# Patient Record
Sex: MALE | Race: ASIAN | Employment: FULL TIME | URBAN - METROPOLITAN AREA
[De-identification: names, ages, dates, MRNs, and addresses within clinical notes are randomized per-mention and may not be internally consistent; named-entity substitution may affect disease eponyms.]

---

## 2019-11-20 ENCOUNTER — HOSPITAL ENCOUNTER (EMERGENCY)
Age: 24
Discharge: HOME | End: 2019-11-20
Attending: EMERGENCY MEDICINE
Payer: OTHER MISCELLANEOUS

## 2019-11-20 ENCOUNTER — APPOINTMENT (OUTPATIENT)
Dept: GENERAL RADIOLOGY | Age: 24
End: 2019-11-20
Attending: EMERGENCY MEDICINE
Payer: OTHER MISCELLANEOUS

## 2019-11-20 VITALS
TEMPERATURE: 98.1 F | WEIGHT: 135 LBS | BODY MASS INDEX: 19.99 KG/M2 | DIASTOLIC BLOOD PRESSURE: 79 MMHG | HEIGHT: 69 IN | SYSTOLIC BLOOD PRESSURE: 124 MMHG | HEART RATE: 82 BPM | RESPIRATION RATE: 16 BRPM | OXYGEN SATURATION: 100 %

## 2019-11-20 DIAGNOSIS — S68.119A FINGERTIP AMPUTATION, INITIAL ENCOUNTER: Primary | ICD-10-CM

## 2019-11-20 PROCEDURE — 75810000293 HC SIMP/SUPERF WND  RPR: Performed by: EMERGENCY MEDICINE

## 2019-11-20 PROCEDURE — 96372 THER/PROPH/DIAG INJ SC/IM: CPT | Performed by: EMERGENCY MEDICINE

## 2019-11-20 PROCEDURE — 90471 IMMUNIZATION ADMIN: CPT | Performed by: EMERGENCY MEDICINE

## 2019-11-20 PROCEDURE — 90715 TDAP VACCINE 7 YRS/> IM: CPT | Performed by: EMERGENCY MEDICINE

## 2019-11-20 PROCEDURE — 77030002916 HC SUT ETHLN J&J -A

## 2019-11-20 PROCEDURE — 73130 X-RAY EXAM OF HAND: CPT

## 2019-11-20 PROCEDURE — 74011250636 HC RX REV CODE- 250/636: Performed by: EMERGENCY MEDICINE

## 2019-11-20 PROCEDURE — 74011000250 HC RX REV CODE- 250: Performed by: EMERGENCY MEDICINE

## 2019-11-20 PROCEDURE — 99284 EMERGENCY DEPT VISIT MOD MDM: CPT | Performed by: EMERGENCY MEDICINE

## 2019-11-20 RX ORDER — CEPHALEXIN 500 MG/1
500 CAPSULE ORAL 4 TIMES DAILY
Qty: 28 CAP | Refills: 0 | Status: SHIPPED | OUTPATIENT
Start: 2019-11-20 | End: 2019-11-27

## 2019-11-20 RX ORDER — MORPHINE SULFATE 4 MG/ML
6 INJECTION INTRAVENOUS
Status: COMPLETED | OUTPATIENT
Start: 2019-11-20 | End: 2019-11-20

## 2019-11-20 RX ORDER — BUPIVACAINE HYDROCHLORIDE 2.5 MG/ML
10 INJECTION, SOLUTION EPIDURAL; INFILTRATION; INTRACAUDAL ONCE
Status: COMPLETED | OUTPATIENT
Start: 2019-11-20 | End: 2019-11-20

## 2019-11-20 RX ORDER — HYDROCODONE BITARTRATE AND ACETAMINOPHEN 10; 325 MG/1; MG/1
1 TABLET ORAL
Qty: 15 TAB | Refills: 0 | Status: SHIPPED | OUTPATIENT
Start: 2019-11-20 | End: 2019-11-26

## 2019-11-20 RX ADMIN — MORPHINE SULFATE 6 MG: 4 INJECTION INTRAVENOUS at 09:36

## 2019-11-20 RX ADMIN — TETANUS TOXOID, REDUCED DIPHTHERIA TOXOID AND ACELLULAR PERTUSSIS VACCINE, ADSORBED 0.5 ML: 5; 2.5; 8; 8; 2.5 SUSPENSION INTRAMUSCULAR at 09:04

## 2019-11-20 RX ADMIN — BUPIVACAINE HYDROCHLORIDE 25 MG: 2.5 INJECTION, SOLUTION EPIDURAL; INFILTRATION; INTRACAUDAL; PERINEURAL at 09:37

## 2019-11-20 NOTE — ED TRIAGE NOTES
Pt ambulatory to room 8 without complications. Pt has injury to right 4th digit. Pt states he dropped spare tire on it. Area is open and nailbed is torn away from the finger. Possible bony prominence noted with some bleeding.

## 2019-11-20 NOTE — ED NOTES
I have reviewed discharge instructions with the patient. The patient verbalized understanding. Patient left ED via Discharge Method: ambulatory to Home with friend. Opportunity for questions and clarification provided. Patient given 2 scripts. To continue your aftercare when you leave the hospital, you may receive an automated call from our care team to check in on how you are doing. This is a free service and part of our promise to provide the best care and service to meet your aftercare needs.  If you have questions, or wish to unsubscribe from this service please call [phone]. Thank you for Choosing our [facility] Emergency Department.

## 2019-11-20 NOTE — DISCHARGE INSTRUCTIONS
Patient Education        Fingertip Amputation: Care Instructions  Your Care Instructions  Fingertip amputation is a common injury. Treatment depends on how much skin, tissue, bone, and nail were damaged and how much of your finger or thumb was cut off. The doctor may have put stitches in your finger. You may need to see a hand surgeon for more treatment. Your fingertips have many nerves and are very sensitive, so the injury may be very painful. Recovery can take several weeks. Your finger may be sensitive to cold and painful for a year or more. You probably will have a splint to protect your finger as it heals. It is very important that you wear the splint exactly as your doctor tells you. Wearing a splint may interfere with your normal activities. Ask for help with daily tasks if you need it. The doctor has checked you carefully, but problems can develop later. If you notice any problems or new symptoms, get medical treatment right away. Follow-up care is a key part of your treatment and safety. Be sure to make and go to all appointments, and call your doctor if you are having problems. It's also a good idea to know your test results and keep a list of the medicines you take. How can you care for yourself at home? · If your doctor put a splint on your finger, wear the splint exactly as directed. Keep the splint dry. Do not remove it until your doctor says you can. · Keep the cut dry for the first 24 to 48 hours. After this, you can shower if your doctor says it is okay. Pat the cut dry. · Don't soak the cut, such as in a bathtub. Your doctor will tell you when it's safe to get the cut wet. · If your doctor told you how to care for your cut, follow your doctor's instructions. If you did not get instructions, follow this general advice:  ? After the first 24 to 48 hours, wash around the cut with clean water 2 times a day. Don't use hydrogen peroxide or alcohol, which can slow healing.   ? You may cover the cut with a thin layer of petroleum jelly, such as Vaseline, and a nonstick bandage. ? Apply more petroleum jelly and replace the bandage as needed. ? Prop up the sore hand on a pillow anytime you sit or lie down during the next 3 days. Try to keep it above the level of your heart. This will help reduce swelling. ? Avoid any activity that could cause your cut to reopen. ? Do not remove the stitches on your own. Your doctor will tell you when to come back to have the stitches removed. · Be safe with medicines. Take pain medicines exactly as directed. ? If the doctor gave you a prescription medicine for pain, take it as prescribed. ? If you are not taking a prescription pain medicine, ask your doctor if you can take an over-the-counter medicine. · If your doctor prescribed antibiotics, take them as directed. Do not stop taking them just because you feel better. You need to take the full course of antibiotics. When should you call for help? Call your doctor now or seek immediate medical care if:    · You have new pain, or your pain gets worse.     · The skin near the wound is cool or pale or changes color.     · The wound starts to bleed, and blood soaks through the bandage. Oozing small amounts of blood is normal.     · Your wound comes open.     · You have symptoms of infection, such as:  ? Increased pain, swelling, warmth, or redness near the wound. ? Red streaks leading from the wound. ? Pus draining from the wound. ? A fever.    Watch closely for changes in your health, and be sure to contact your doctor if:    · You do not get better as expected. Where can you learn more? Go to http://mannie-kristine.info/. Enter 302 [id number] in the search box to learn more about \"Fingertip Amputation: Care Instructions. \"  Current as of: June 26, 2019  Content Version: 12.2  © 1521-8945 [employer].  Care instructions adapted under license by [employer] (which disclaims liability or warranty for this information). If you have questions about a medical condition or this instruction, always ask your healthcare professional. [place] [address] any warranty or liability for your use of this information.

## 2019-11-20 NOTE — PROGRESS NOTES
SW went to meet with patient who was not in his room. SW will return.      Pardeep Martell, [address] [facility]    [address] [facility]  [email or web address]

## 2019-11-20 NOTE — ED PROVIDER NOTES
Patient dropped a tire on his right ring finger less than an hour ago at work. Is an open wound to the distal portion of the finger with bleeding controlled. Limited range of motion. Numbness present. Unknown last tetanus shot. The history is provided by the patient. No  was used. Finger Pain    This is a new problem. The current episode started less than 1 hour ago. The problem occurs constantly. The problem has not changed since onset. The pain is present in the right fingers. The quality of the pain is described as aching. The pain is mild. Associated symptoms include numbness and limited range of motion. Pertinent negatives include no back pain and no neck pain. The symptoms are aggravated by movement. He has tried nothing for the symptoms. There has been a history of trauma. No past medical history on file. No past surgical history on file. No family history on file.     Social History     Socioeconomic History    Marital status: Not on file     Spouse name: Not on file    Number of children: Not on file    Years of education: Not on file    Highest education level: Not on file   Occupational History    Not on file   Social Needs    Financial resource strain: Not on file    Food insecurity:     Worry: Not on file     Inability: Not on file    Transportation needs:     Medical: Not on file     Non-medical: Not on file   Tobacco Use    Smoking status: Not on file   Substance and Sexual Activity    Alcohol use: Not on file    Drug use: Not on file    Sexual activity: Not on file   Lifestyle    Physical activity:     Days per week: Not on file     Minutes per session: Not on file    Stress: Not on file   Relationships    Social connections:     Talks on phone: Not on file     Gets together: Not on file     Attends Muslim service: Not on file     Active member of club or organization: Not on file     Attends meetings of clubs or organizations: Not on file Relationship status: Not on file    Intimate partner violence:     Fear of current or ex partner: Not on file     Emotionally abused: Not on file     Physically abused: Not on file     Forced sexual activity: Not on file   Other Topics Concern    Not on file   Social History Narrative    Not on file         ALLERGIES: Patient has no known allergies. Review of Systems   Constitutional: Negative for chills and fever. Eyes: Negative for pain and redness. Respiratory: Negative for chest tightness, shortness of breath and wheezing. Cardiovascular: Negative for chest pain and leg swelling. Gastrointestinal: Negative for abdominal pain, diarrhea, nausea and vomiting. Musculoskeletal: Negative for back pain, gait problem, neck pain and neck stiffness. Skin: Positive for wound. Negative for rash. Neurological: Positive for numbness. Negative for weakness and headaches. Vitals:    11/20/19 0823 11/20/19 0824 11/20/19 0825 11/20/19 0826   BP:       Pulse:       Resp:       Temp:       SpO2: 100% 100% 100% 100%   Weight:       Height:                Physical Exam  Constitutional:       Appearance: Normal appearance. He is well-developed. HENT:      Head: Normocephalic and atraumatic. Neck:      Musculoskeletal: Normal range of motion and neck supple. Cardiovascular:      Rate and Rhythm: Normal rate and regular rhythm. Pulmonary:      Effort: Pulmonary effort is normal.      Breath sounds: Normal breath sounds. Abdominal:      General: Bowel sounds are normal.      Palpations: Abdomen is soft. Tenderness: There is no tenderness. Musculoskeletal:        Hands:    Skin:     General: Skin is warm and dry. Neurological:      General: No focal deficit present. Mental Status: He is alert and oriented to person, place, and time. MDM  Number of Diagnoses or Management Options  Diagnosis management comments: Partial amputation of the distal right ring finger.   Distal tuft fracture. Wound irrigated and slightly debrided and closed with 3 sutures to loosely approximate the distal portion of the finger. Will refer to orthopedics for further management. Being a work incident will refer to work well also. Amount and/or Complexity of Data Reviewed  Tests in the radiology section of CPT®: ordered and reviewed  Tests in the medicine section of CPT®: ordered and reviewed    Patient Progress  Patient progress: stable    ED Course as of Nov 20 1122 Wed Nov 20, 2019   1029 Patient ID confirmed. Injured finger identified. No allergies. Digital block performed with 0.25% marcaine. 3ml at the base of the finger on the palmar surface    [GH]      ED Course User Index  [GH] Jose Meyers MD       Wound Repair  Date/Time: 11/20/2019 11:24 AM  Performed by: attendingPreparation: skin prepped with Betadine and sterile field established  Pre-procedure re-eval: Immediately prior to the procedure, the patient was reevaluated and found suitable for the planned procedure and any planned medications. Location details: right ring finger  Wound length:2.6 - 7.5 cm  Anesthesia: digital block    Anesthesia:  Local Anesthetic: bupivacaine 0.25% without epinephrine  Foreign bodies: no foreign bodies  Irrigation solution: saline  Irrigation method: syringe  Debridement: minimal (nail and partial skin)  Skin closure: 4-0 nylon  Number of sutures: 3  Technique: simple  Approximation: loose  Dressing: 4x4 and gauze roll  Patient tolerance: Patient tolerated the procedure well with no immediate complications  My total time at bedside, performing this procedure was 16-30 minutes. XR HAND RT MIN 3 V (Final result)   Result time 11/20/19 08:32:51   Final result by Marcy Habermann, MD (11/20/19 08:32:51)                Impression:    IMPRESSION:  1.  Partial amputation of the distal fourth digit.  In addition a soft tissue  injury, there is a mildly displaced fracture of the distal tuft of the distal  phalanx as described above. Narrative:    RIGHT HAND RADIOGRAPHS, 11/20/2019    CLINICAL HISTORY:  Open injury. TECHNIQUE:  AP, lateral and oblique views of the right hand. FINDINGS:  Three views of the right hand are submitted for evaluation.  The visualized  osseous structures demonstrate normal alignment.  There appears to be partial  amputation of the most distal fourth digit. In addition a soft tissue injury,  there is fracture of the distal tuft of the distal phalanx which is medially  displaced. No acute fracture is otherwise seen.  No definite abnormal radiopaque  soft tissue foreign body is seen.  Density is seen which appears to represent  bandaging artifact.

## 2019-11-25 ENCOUNTER — ANESTHESIA EVENT (OUTPATIENT)
Dept: SURGERY | Age: 24
End: 2019-11-25
Payer: OTHER MISCELLANEOUS

## 2019-11-25 NOTE — H&P
Date of Service: 2019-11-22  Work Status:  ?????     CC: Crush injury of the right ring fingertip    HPI:  The patient is a 66-year-old right-hand-dominant man who works at [employer] as a . Two days ago on 11/20/2019, a tire dropped and crushed the right ring fingertip at work. He was seen in the [facility] emergency room and found to have an avulsion of the nail and laceration around the pulp of the finger and open tuft fracture. This was irrigated and debrided and loosely sutured in the emergency room. Prior to this, the patient had no problem with the hand or fingers. PMH, SH & ROS:  This form has been filled out, reviewed, signed and placed in the patient's chart. I reviewed pertinent positive and negative responses and tried to associate them with the musculoskeletal problem of the patient. Other positive responses were deferred to the patient's primary care physician. The patient has a negative review of systems. He does drink some. He does not smoke. He is generally healthy. He had some type of heart surgery when he was less than 3years of age. CURRENT MEDICATIONS:  Analgesics and an antibiotic for this injury. Otherwise no regular medicines. ALLERGIES:  None known. PE:  The patient is an alert, oriented, young healthy-appearing man. Exam of the left hand shows the patient to have an open wound and crush-type injury of the fingertip of the ring finger. The nail is completely avulsed and absent. The nail bed is torn. The pulp of the finger is viable and has good coloration and actually some sensation to light touch. It is deformed and deviated ulnarward. There is no obvious infection present. The DIP joint has active flexion and extension as does the PIP joint. The other fingers are uninjured.        RADIOGRAPHS:  A review of x-rays from [facility] from 11/20/2019 of the left hand shows the patient to have had an open wound and deformity of the soft tissues of the tip of the ring finger. There is loss of bone along the distal 2 to 3 mm of the tuft of the distal phalanx. There is deformity of the soft tissue noted. DIAGNOSIS:    1. Crush injury of the left ring fingertip with open fracture of the distal phalanx. 2. Nail bed injury of the left ring finger. DISPOSITION:  The problem was discussed with the patient. His wound needs to be revised. I believe it will be possible to save the entire length of the finger with the pulp. It is however now deviated and rotated a bit. I believe he needs to be taken to the operating room for this to be done under good anesthesia and lighting with good instrumentation. He is agreeable. I am going to schedule him for this to be done this coming week as an outpatient under either a digital block or an IV regional.  In the meantime, the patient will need to keep the wound dry and dressed cleanly. He is not able to work as a  at this point.

## 2019-11-26 ENCOUNTER — ANESTHESIA (OUTPATIENT)
Dept: SURGERY | Age: 24
End: 2019-11-26
Payer: OTHER MISCELLANEOUS

## 2019-11-26 ENCOUNTER — HOSPITAL ENCOUNTER (OUTPATIENT)
Age: 24
Setting detail: OUTPATIENT SURGERY
Discharge: HOME | End: 2019-11-26
Attending: ORTHOPAEDIC SURGERY | Admitting: ORTHOPAEDIC SURGERY
Payer: OTHER MISCELLANEOUS

## 2019-11-26 VITALS
HEART RATE: 81 BPM | DIASTOLIC BLOOD PRESSURE: 80 MMHG | RESPIRATION RATE: 16 BRPM | OXYGEN SATURATION: 98 % | WEIGHT: 140 LBS | BODY MASS INDEX: 20.67 KG/M2 | SYSTOLIC BLOOD PRESSURE: 135 MMHG | TEMPERATURE: 98 F

## 2019-11-26 DIAGNOSIS — G89.18 POST-OP PAIN: Primary | ICD-10-CM

## 2019-11-26 PROCEDURE — 76210000020 HC REC RM PH II FIRST 0.5 HR: Performed by: ORTHOPAEDIC SURGERY

## 2019-11-26 PROCEDURE — 74011250636 HC RX REV CODE- 250/636: Performed by: ANESTHESIOLOGY

## 2019-11-26 PROCEDURE — 77030002916 HC SUT ETHLN J&J -A: Performed by: ORTHOPAEDIC SURGERY

## 2019-11-26 PROCEDURE — 74011250636 HC RX REV CODE- 250/636: Performed by: ORTHOPAEDIC SURGERY

## 2019-11-26 PROCEDURE — 76060000032 HC ANESTHESIA 0.5 TO 1 HR: Performed by: ORTHOPAEDIC SURGERY

## 2019-11-26 PROCEDURE — 76010000138 HC OR TIME 0.5 TO 1 HR: Performed by: ORTHOPAEDIC SURGERY

## 2019-11-26 PROCEDURE — 77030018836 HC SOL IRR NACL ICUM -A: Performed by: ORTHOPAEDIC SURGERY

## 2019-11-26 PROCEDURE — 74011250636 HC RX REV CODE- 250/636: Performed by: NURSE ANESTHETIST, CERTIFIED REGISTERED

## 2019-11-26 PROCEDURE — 74011000250 HC RX REV CODE- 250: Performed by: ORTHOPAEDIC SURGERY

## 2019-11-26 PROCEDURE — 77030031139 HC SUT VCRL2 J&J -A: Performed by: ORTHOPAEDIC SURGERY

## 2019-11-26 PROCEDURE — 77030039266 HC ADH SKN EXOFIN S2SG -A: Performed by: ORTHOPAEDIC SURGERY

## 2019-11-26 PROCEDURE — 77030002933 HC SUT MCRYL J&J -A: Performed by: ORTHOPAEDIC SURGERY

## 2019-11-26 PROCEDURE — 77030003602 HC NDL NRV BLK BBMI -B: Performed by: ANESTHESIOLOGY

## 2019-11-26 PROCEDURE — 76210000063 HC OR PH I REC FIRST 0.5 HR: Performed by: ORTHOPAEDIC SURGERY

## 2019-11-26 PROCEDURE — 77030000032 HC CUF TRNQT ZIMM -B: Performed by: ORTHOPAEDIC SURGERY

## 2019-11-26 RX ORDER — HYDROCODONE BITARTRATE AND ACETAMINOPHEN 10; 325 MG/1; MG/1
1 TABLET ORAL
Qty: 20 TAB | Refills: 0 | Status: SHIPPED | OUTPATIENT
Start: 2019-11-26 | End: 2019-12-01

## 2019-11-26 RX ORDER — MIDAZOLAM HYDROCHLORIDE 1 MG/ML
5 INJECTION, SOLUTION INTRAMUSCULAR; INTRAVENOUS ONCE
Status: DISCONTINUED | OUTPATIENT
Start: 2019-11-26 | End: 2019-11-26 | Stop reason: HOSPADM

## 2019-11-26 RX ORDER — HYDROMORPHONE HYDROCHLORIDE 2 MG/ML
0.5 INJECTION, SOLUTION INTRAMUSCULAR; INTRAVENOUS; SUBCUTANEOUS
Status: DISCONTINUED | OUTPATIENT
Start: 2019-11-26 | End: 2019-11-26 | Stop reason: HOSPADM

## 2019-11-26 RX ORDER — PROPOFOL 10 MG/ML
INJECTION, EMULSION INTRAVENOUS AS NEEDED
Status: DISCONTINUED | OUTPATIENT
Start: 2019-11-26 | End: 2019-11-26 | Stop reason: HOSPADM

## 2019-11-26 RX ORDER — FENTANYL CITRATE 50 UG/ML
100 INJECTION, SOLUTION INTRAMUSCULAR; INTRAVENOUS ONCE
Status: DISCONTINUED | OUTPATIENT
Start: 2019-11-26 | End: 2019-11-26 | Stop reason: HOSPADM

## 2019-11-26 RX ORDER — PROPOFOL 10 MG/ML
INJECTION, EMULSION INTRAVENOUS
Status: DISCONTINUED | OUTPATIENT
Start: 2019-11-26 | End: 2019-11-26 | Stop reason: HOSPADM

## 2019-11-26 RX ORDER — LIDOCAINE HYDROCHLORIDE 20 MG/ML
INJECTION, SOLUTION EPIDURAL; INFILTRATION; INTRACAUDAL; PERINEURAL AS NEEDED
Status: DISCONTINUED | OUTPATIENT
Start: 2019-11-26 | End: 2019-11-26 | Stop reason: HOSPADM

## 2019-11-26 RX ORDER — SODIUM CHLORIDE, SODIUM LACTATE, POTASSIUM CHLORIDE, CALCIUM CHLORIDE 600; 310; 30; 20 MG/100ML; MG/100ML; MG/100ML; MG/100ML
75 INJECTION, SOLUTION INTRAVENOUS CONTINUOUS
Status: DISCONTINUED | OUTPATIENT
Start: 2019-11-26 | End: 2019-11-26 | Stop reason: HOSPADM

## 2019-11-26 RX ORDER — CEFAZOLIN SODIUM/WATER 2 G/20 ML
2 SYRINGE (ML) INTRAVENOUS ONCE
Status: COMPLETED | OUTPATIENT
Start: 2019-11-26 | End: 2019-11-26

## 2019-11-26 RX ORDER — FENTANYL CITRATE 50 UG/ML
INJECTION, SOLUTION INTRAMUSCULAR; INTRAVENOUS AS NEEDED
Status: DISCONTINUED | OUTPATIENT
Start: 2019-11-26 | End: 2019-11-26 | Stop reason: HOSPADM

## 2019-11-26 RX ORDER — SODIUM CHLORIDE 0.9 % (FLUSH) 0.9 %
5-40 SYRINGE (ML) INJECTION AS NEEDED
Status: DISCONTINUED | OUTPATIENT
Start: 2019-11-26 | End: 2019-11-26 | Stop reason: HOSPADM

## 2019-11-26 RX ORDER — SODIUM CHLORIDE 0.9 % (FLUSH) 0.9 %
5-40 SYRINGE (ML) INJECTION EVERY 8 HOURS
Status: DISCONTINUED | OUTPATIENT
Start: 2019-11-26 | End: 2019-11-26 | Stop reason: HOSPADM

## 2019-11-26 RX ORDER — MIDAZOLAM HYDROCHLORIDE 1 MG/ML
2 INJECTION, SOLUTION INTRAMUSCULAR; INTRAVENOUS
Status: COMPLETED | OUTPATIENT
Start: 2019-11-26 | End: 2019-11-26

## 2019-11-26 RX ORDER — HYDROCODONE BITARTRATE AND ACETAMINOPHEN 5; 325 MG/1; MG/1
2 TABLET ORAL AS NEEDED
Status: DISCONTINUED | OUTPATIENT
Start: 2019-11-26 | End: 2019-11-26 | Stop reason: HOSPADM

## 2019-11-26 RX ORDER — OXYCODONE HYDROCHLORIDE 5 MG/1
5 TABLET ORAL
Status: DISCONTINUED | OUTPATIENT
Start: 2019-11-26 | End: 2019-11-26 | Stop reason: HOSPADM

## 2019-11-26 RX ORDER — MIDAZOLAM HYDROCHLORIDE 1 MG/ML
INJECTION, SOLUTION INTRAMUSCULAR; INTRAVENOUS AS NEEDED
Status: DISCONTINUED | OUTPATIENT
Start: 2019-11-26 | End: 2019-11-26 | Stop reason: HOSPADM

## 2019-11-26 RX ORDER — LIDOCAINE HYDROCHLORIDE 10 MG/ML
0.1 INJECTION INFILTRATION; PERINEURAL AS NEEDED
Status: DISCONTINUED | OUTPATIENT
Start: 2019-11-26 | End: 2019-11-26 | Stop reason: HOSPADM

## 2019-11-26 RX ADMIN — PROPOFOL 100 MCG/KG/MIN: 10 INJECTION, EMULSION INTRAVENOUS at 12:51

## 2019-11-26 RX ADMIN — Medication 2 G: at 12:42

## 2019-11-26 RX ADMIN — MIDAZOLAM HYDROCHLORIDE 1 MG: 2 INJECTION, SOLUTION INTRAMUSCULAR; INTRAVENOUS at 12:48

## 2019-11-26 RX ADMIN — FENTANYL CITRATE 25 MCG: 50 INJECTION INTRAMUSCULAR; INTRAVENOUS at 12:54

## 2019-11-26 RX ADMIN — FENTANYL CITRATE 25 MCG: 50 INJECTION INTRAMUSCULAR; INTRAVENOUS at 13:11

## 2019-11-26 RX ADMIN — FENTANYL CITRATE 25 MCG: 50 INJECTION INTRAMUSCULAR; INTRAVENOUS at 13:00

## 2019-11-26 RX ADMIN — MIDAZOLAM HYDROCHLORIDE 1 MG: 2 INJECTION, SOLUTION INTRAMUSCULAR; INTRAVENOUS at 13:05

## 2019-11-26 RX ADMIN — MIDAZOLAM 2 MG: 1 INJECTION INTRAMUSCULAR; INTRAVENOUS at 11:49

## 2019-11-26 RX ADMIN — SODIUM CHLORIDE, SODIUM LACTATE, POTASSIUM CHLORIDE, AND CALCIUM CHLORIDE 75 ML/HR: 600; 310; 30; 20 INJECTION, SOLUTION INTRAVENOUS at 11:48

## 2019-11-26 RX ADMIN — FENTANYL CITRATE 25 MCG: 50 INJECTION INTRAMUSCULAR; INTRAVENOUS at 13:05

## 2019-11-26 RX ADMIN — PROPOFOL 50 MG: 10 INJECTION, EMULSION INTRAVENOUS at 12:51

## 2019-11-26 NOTE — OP NOTES
[address] [facility]  OPERATIVE REPORT    Name:  Ricci Arrieta  MR#:  [id number]  :  1995  ACCOUNT #:  [de-identified]  DATE OF SERVICE:  2019    PREOPERATIVE DIAGNOSES:  1. Crush injury of the right ring finger. 2.  Laceration of the nail bed, right ring finger. 3.  Fracture of the distal phalanx, right ring finger. POSTOPERATIVE DIAGNOSES:  1. Crush injury of the right ring finger. 2.  Laceration of the nail bed, right ring finger. 3.  Fracture of the distal phalanx, right ring finger. PROCEDURE PERFORMED:  1. Suturing of soft tissues of crush injury of the right ring finger. 2.  Repair of nail bed, right ring finger. SURGEON:  VIPUL Garcia MD    ASSISTANT:  None. ANESTHESIA:  IV regional.    COMPLICATIONS:  None. SPECIMENS REMOVED:  Tissue removed for histologic exam, none. IMPLANTS:  None. ESTIMATED BLOOD LOSS:  minimal.    PROCEDURAL NOTE:  With the patient under an IV regional anesthetic, the right upper extremity was prepped with ChloraPrep and draped as a sterile field. Time-out was held identifying the patient, surgeon, procedure and operative site. The patient had been given preoperative IV Ancef. The patient had a crush injury of the right ring finger that had occurred approximately a week ago when a tire and rim fell on the finger crushing it against the underlying support. He had had an avulsion of the nail, tearing of the nail bed and fracture of the distal phalanx as well as near circumferential laceration of the soft tissues. He had been seen in the emergency room where the finger had been irrigated and sutured; however, when seen in the office, he was noted to have considerable deformity of the pulp of the finger with it being displaced ulnarward and rotated somewhat. The patient's stitches were taken out. He had a total of three in place.   The wound was then irrigated with bulb syringe and using nontoothed through smooth forceps, Include Location In Plan?: Yes Detail Level: Zone blood clot and dried drainage were gradually removed from the wound. There was noted to have been a complete avulsion of the nail with a tear of the nail fold from the ulnar aspect. The nail bed was also avulsed and the most proximal portion was not beneath the nail fold as it should have been. The finger was positioned in what looked to be essentially anatomic position and a couple of 4-0 nylon stitches placed on the volar radial aspect pulling the distal part of the fingertip back into place. As mentioned, there was a near circumferential laceration with remaining tissues along the ulnar aspect of the pulp. The flap of skin that compressed some of the nail fold was able to be pulled back ulnarward and sutured with a single stitch recreating most of the nail fold. Using 4-0 Vicryl Rapide, a horizontal mattress stitch was placed from the dorsum beneath the nail fold and out into the wound. It was then passed through the most proximal portion of the nail bed tissue and back beneath the nail fold and out dorsally. When this was tied, I was able to pull the proximal part of the nail bed back into the area beneath the nail fold and then recreated. A couple of additional scattered sutures were placed volarly. The finger looked much better after completion of the suturing. As noted, there was an absence of the nail and a piece of Xeroform was tucked into the nail fold or beneath the nail fold to try to keep this open or prevent it from scarring. An additional piece of Xeroform was then placed over the dorsum of the finger and around the cut followed by wrapping with 4x4 gauze and Shahana wrapping. A digital block was done at the metacarpal neck level using 6 or 7 mL of 1% plain lidocaine. The patient had release of the tourniquet with good color returning to the fingertip. The patient tolerated the procedure well, was sent to recovery room in good condition.       [address] [facility] [address] Include Location In Plan Question?: No MD [id number]  D:  11/26/2019 13:45  T:  11/26/2019 15:05  JOB #:  [id number]  CC:  [facility]

## 2019-11-26 NOTE — ANESTHESIA PROCEDURE NOTES
Peripheral Block    Start time: 11/26/2019 12:46 PM  Performed by: Jimmy Cooper CRNA  Authorized by: Elana Watson MD       Pre-procedure:    Indications: at surgeon's request and procedure for pain    Preanesthetic Checklist: patient identified, risks and benefits discussed, site marked, timeout performed, anesthesia consent given and patient being monitored    Timeout Time: 12:45          Block Type:   Block Type:  [place] block  Laterality:  Right  Patient Position:  Supine  Block Limb IV Checked: Yes    Esmarch exsanguination: Yes    Tourniquet Type:  Single  Tourniquet Location:  Below elbow  Tourniquet Inflated:  11/26/2019 12:49 PM  Inflation (mmHg):  250  Limb w/o Radial Pulse: Yes    Infused Agent:  Lidocaine 0.5%  Volume Infused (mL):  45  Tourniquet Deflated:  11/26/2019 1:13 PM    Assessment:    Injection Assessment:   Patient tolerance:  Patient tolerated the procedure well with no immediate complications

## 2019-11-26 NOTE — ANESTHESIA POSTPROCEDURE EVALUATION
Procedure(s):  RIGHT RING FINGER NAIL BED REPAIR AND REVISION OF SOFT TISSE.    regional    Anesthesia Post Evaluation      Multimodal analgesia: multimodal analgesia used between 6 hours prior to anesthesia start to PACU discharge  Patient location during evaluation: bedside  Patient participation: complete - patient participated  Level of consciousness: awake and alert  Pain score: 3  Pain management: adequate  Airway patency: patent  Anesthetic complications: no  Cardiovascular status: acceptable and hemodynamically stable  Respiratory status: acceptable  Hydration status: acceptable  Post anesthesia nausea and vomiting:  none      Vitals Value Taken Time   /80 11/26/2019  1:36 PM   Temp 36.6 °C (97.9 °F) 11/26/2019  1:26 PM   Pulse 80 11/26/2019  1:37 PM   Resp 14 11/26/2019  1:36 PM   SpO2 98 % 11/26/2019  1:37 PM   Vitals shown include unvalidated device data.

## 2019-11-26 NOTE — ANESTHESIA PREPROCEDURE EVALUATION
Relevant Problems   No relevant active problems       Anesthetic History   No history of anesthetic complications            Review of Systems / Medical History  Patient summary reviewed and pertinent labs reviewed    Pulmonary  Within defined limits                 Neuro/Psych   Within defined limits           Cardiovascular  Within defined limits                Exercise tolerance: >4 METS     GI/Hepatic/Renal  Within defined limits              Endo/Other  Within defined limits           Other Findings              Physical Exam    Airway  Mallampati: II  TM Distance: 4 - 6 cm  Neck ROM: normal range of motion   Mouth opening: Normal     Cardiovascular  Regular rate and rhythm,  S1 and S2 normal,  no murmur, click, rub, or gallop             Dental         Pulmonary  Breath sounds clear to auscultation               Abdominal         Other Findings            Anesthetic Plan    ASA: 1  Anesthesia type: regional - Chris block          Induction: Intravenous  Anesthetic plan and risks discussed with: Patient and Family

## 2019-11-26 NOTE — INTERVAL H&P NOTE
H&P Update: 
[place] was seen and examined. Pt is alert and oriented. Chest: Clear w/o SOB. C/V:  RR&R History and physical has been reviewed. The patient has been examined. There have been no significant clinical changes since the completion of the originally dated History and Physical. 
Patient identified by surgeon; surgical site was confirmed by patient and surgeon. The patient is here for suturing of the nailbed and soft tissues of the right ring fingertip.

## 2019-11-26 NOTE — DISCHARGE INSTRUCTIONS
POST OP INSTRUCTIONS      1. Patient has appointment for 12/2/19 at 9:20 AM at the [facility]. 2.  For problems call Dr Roxanne Douglas, [address],  [phone]          Appointments only,  [phone]    3. Leave the dressings on and keep dry. DIET  · Clear liquids until no nausea or vomiting; then light diet for the first day. · Advance to regular diet on second day, unless your doctor orders otherwise. · If nausea and vomiting continues, call your doctor. PAIN  · Take pain medication as directed by your doctor. · Call your doctor if pain is NOT relieved by medication. · DO NOT take aspirin of blood thinners unless directed by your doctor. CALL YOUR DOCTOR IF   · Excessive bleeding that does not stop after holding pressure over the area  · Temperature of 101 degrees F or above  · Excessive redness, swelling or bruising, and/ or green or yellow, smelly discharge from incision    AFTER ANESTHESIA   · For the first 24 hours: DO NOT Drive, Drink alcoholic beverages, or Make important decisions. · Be aware of dizziness following anesthesia and while taking pain medication. After general anesthesia or intravenous sedation, for 24 hours or while taking prescription Narcotics:  · Limit your activities  · A responsible adult needs to be with you for the next 24 hours  · Do not drive and operate hazardous machinery  · Do not make important personal or business decisions  · Do  not drink alcoholic beverages  · If you have not urinated within 8 hours after discharge, please contact your surgeon on call. · If you have sleep apnea and have a CPAP machine, please use it for all naps and sleeping. · Please use caution when taking narcotics and any of your home medications that may cause drowsiness. *  Please give a list of your current medications to your Primary Care Provider.   *  Please update this list whenever your medications are discontinued, doses are      changed, or new medications (including over-the-counter products) are added. *  Please carry medication information at all times in case of emergency situations. These are general instructions for a healthy lifestyle:  No smoking/ No tobacco products/ Avoid exposure to second hand smoke  Surgeon General's Warning:  Quitting smoking now greatly reduces serious risk to your health. Obesity, smoking, and sedentary lifestyle greatly increases your risk for illness  A healthy diet, regular physical exercise & weight monitoring are important for maintaining a healthy lifestyle    You may be retaining fluid if you have a history of heart failure or if you experience any of the following symptoms:  Weight gain of 3 pounds or more overnight or 5 pounds in a week, increased swelling in our hands or feet or shortness of breath while lying flat in bed. Please call your doctor as soon as you notice any of these symptoms; do not wait until your next office visit.

## 2019-11-26 NOTE — BRIEF OP NOTE
BRIEF OPERATIVE NOTE    Date of Procedure: 11/26/2019   Preoperative Diagnosis: Crushing injury of right ring finger, initial encounter [S67.194A]  Postoperative Diagnosis: Crushing injury of right ring finger, initial encounter [A10.409L]    Procedure(s):  RIGHT RING FINGER NAIL BED REPAIR AND REVISION OF SOFT TISSE  Surgeon(s) and Role:     * Marielle Rutledge MD - Primary         Surgical Assistant: none    Surgical Staff:  Circ-1: Jazmin Villa RN  Scrub Tech-1: Tejas Smalls  Event Time In Time Out   Incision Start 1306    Incision Close 1320      Anesthesia: [place]   Estimated Blood Loss: minimal  Specimens: * No specimens in log *   Findings:  Torn nail bed and near circumferential laceration of the soft tissues of the distal phalanx   Complications: none  Implants: * No implants in log *

## 2019-12-18 ENCOUNTER — APPOINTMENT (RX ONLY)
Dept: URBAN - METROPOLITAN AREA CLINIC 349 | Facility: CLINIC | Age: 24
Setting detail: DERMATOLOGY
End: 2019-12-18

## 2019-12-18 DIAGNOSIS — B07.8 OTHER VIRAL WARTS: ICD-10-CM

## 2019-12-18 DIAGNOSIS — L70.0 ACNE VULGARIS: ICD-10-CM

## 2019-12-18 DIAGNOSIS — Z71.89 OTHER SPECIFIED COUNSELING: ICD-10-CM

## 2019-12-18 DIAGNOSIS — D22 MELANOCYTIC NEVI: ICD-10-CM

## 2019-12-18 PROBLEM — D22.5 MELANOCYTIC NEVI OF TRUNK: Status: ACTIVE | Noted: 2019-12-18

## 2019-12-18 PROCEDURE — ? RECOMMENDATIONS

## 2019-12-18 PROCEDURE — ? COUNSELING

## 2019-12-18 PROCEDURE — ? PRESCRIPTION

## 2019-12-18 PROCEDURE — 99202 OFFICE O/P NEW SF 15 MIN: CPT | Mod: 25

## 2019-12-18 PROCEDURE — ? LIQUID NITROGEN

## 2019-12-18 PROCEDURE — 17110 DESTRUCTION B9 LES UP TO 14: CPT

## 2019-12-18 PROCEDURE — ? TREATMENT REGIMEN

## 2019-12-18 RX ORDER — BENZOYL PEROXIDE 95 MG/G
AEROSOL, FOAM TOPICAL
Qty: 1 | Refills: 3 | Status: ERX | COMMUNITY
Start: 2019-12-18

## 2019-12-18 RX ORDER — TAZAROTENE 1 MG/G
AEROSOL, FOAM TOPICAL
Qty: 1 | Refills: 3 | Status: ERX | COMMUNITY
Start: 2019-12-18

## 2019-12-18 RX ADMIN — TAZAROTENE: 1 AEROSOL, FOAM TOPICAL at 00:00

## 2019-12-18 RX ADMIN — BENZOYL PEROXIDE: 95 AEROSOL, FOAM TOPICAL at 00:00

## 2019-12-18 ASSESSMENT — LOCATION DETAILED DESCRIPTION DERM
LOCATION DETAILED: LEFT SMALL FINGERTIP
LOCATION DETAILED: LEFT PROXIMAL PALMAR INDEX FINGER
LOCATION DETAILED: LEFT INFERIOR CENTRAL MALAR CHEEK
LOCATION DETAILED: LEFT DISTAL PALMAR RING FINGER
LOCATION DETAILED: RIGHT MEDIAL INFERIOR CHEST
LOCATION DETAILED: RIGHT INFERIOR MEDIAL FOREHEAD
LOCATION DETAILED: RIGHT SUPERIOR UPPER BACK
LOCATION DETAILED: RIGHT INFERIOR CENTRAL MALAR CHEEK

## 2019-12-18 ASSESSMENT — LOCATION SIMPLE DESCRIPTION DERM
LOCATION SIMPLE: LEFT RING FINGER
LOCATION SIMPLE: RIGHT UPPER BACK
LOCATION SIMPLE: RIGHT FOREHEAD
LOCATION SIMPLE: LEFT INDEX FINGER
LOCATION SIMPLE: LEFT SMALL FINGER
LOCATION SIMPLE: CHEST
LOCATION SIMPLE: LEFT CHEEK
LOCATION SIMPLE: RIGHT CHEEK

## 2019-12-18 ASSESSMENT — LOCATION ZONE DERM
LOCATION ZONE: FINGER
LOCATION ZONE: FACE
LOCATION ZONE: TRUNK

## 2019-12-18 NOTE — PROCEDURE: LIQUID NITROGEN
Include Z78.9 (Other Specified Conditions Influencing Health Status) As An Associated Diagnosis?: Yes
Duration Of Freeze Thaw-Cycle (Seconds): 2
Render Post-Care Instructions In Note?: no
Medical Necessity Information: It is in your best interest to select a reason for this procedure from the list below. All of these items fulfill various CMS LCD requirements except the new and changing color options.
Number Of Freeze-Thaw Cycles: 3 freeze-thaw cycles
Consent: The patient's consent was obtained including but not limited to risks of crusting, scabbing, blistering, scarring, darker or lighter pigmentary change, recurrence, incomplete removal and infection.
Detail Level: Detailed
Post-Care Instructions: I reviewed with the patient in detail post-care instructions. Patient is to wear sunprotection, and avoid picking at any of the treated lesions. Pt may apply Vaseline to crusted or scabbing areas.
Medical Necessity Clause: This procedure was medically necessary because the lesions that were treated were:

## 2019-12-18 NOTE — PROCEDURE: RECOMMENDATIONS
Recommendations (Free Text): Informed pt he should not drink while on antibiotics
Detail Level: Detailed

## 2019-12-18 NOTE — PROCEDURE: COUNSELING
Minocycline Pregnancy And Lactation Text: This medication is Pregnancy Category D and not consider safe during pregnancy. It is also excreted in breast milk.
Spironolactone Counseling: Patient advised regarding risks of diarrhea, abdominal pain, hyperkalemia, birth defects (for female patients), liver toxicity and renal toxicity. The patient may need blood work to monitor liver and kidney function and potassium levels while on therapy. The patient verbalized understanding of the proper use and possible adverse effects of spironolactone.  All of the patient's questions and concerns were addressed.
Birth Control Pills Counseling: Birth Control Pill Counseling: I discussed with the patient the potential side effects of OCPs including but not limited to increased risk of stroke, heart attack, thrombophlebitis, deep venous thrombosis, hepatic adenomas, breast changes, GI upset, headaches, and depression.  The patient verbalized understanding of the proper use and possible adverse effects of OCPs. All of the patient's questions and concerns were addressed.
High Dose Vitamin A Pregnancy And Lactation Text: High dose vitamin A therapy is contraindicated during pregnancy and breast feeding.
Tazorac Pregnancy And Lactation Text: This medication is not safe during pregnancy. It is unknown if this medication is excreted in breast milk.
Topical Retinoid counseling:  Patient advised to apply a pea-sized amount only at bedtime and wait 30 minutes after washing their face before applying.  If too drying, patient may add a non-comedogenic moisturizer. The patient verbalized understanding of the proper use and possible adverse effects of retinoids.  All of the patient's questions and concerns were addressed.
Detail Level: Zone
Tazorac Counseling:  Patient advised that medication is irritating and drying.  Patient may need to apply sparingly and wash off after an hour before eventually leaving it on overnight.  The patient verbalized understanding of the proper use and possible adverse effects of tazorac.  All of the patient's questions and concerns were addressed.
Topical Clindamycin Pregnancy And Lactation Text: This medication is Pregnancy Category B and is considered safe during pregnancy. It is unknown if it is excreted in breast milk.
Tetracycline Counseling: Patient counseled regarding possible photosensitivity and increased risk for sunburn.  Patient instructed to avoid sunlight, if possible.  When exposed to sunlight, patients should wear protective clothing, sunglasses, and sunscreen.  The patient was instructed to call the office immediately if the following severe adverse effects occur:  hearing changes, easy bruising/bleeding, severe headache, or vision changes.  The patient verbalized understanding of the proper use and possible adverse effects of tetracycline.  All of the patient's questions and concerns were addressed. Patient understands to avoid pregnancy while on therapy due to potential birth defects.
Topical Clindamycin Counseling: Patient counseled that this medication may cause skin irritation or allergic reactions.  In the event of skin irritation, the patient was advised to reduce the amount of the drug applied or use it less frequently.   The patient verbalized understanding of the proper use and possible adverse effects of clindamycin.  All of the patient's questions and concerns were addressed.
Azithromycin Pregnancy And Lactation Text: This medication is considered safe during pregnancy and is also secreted in breast milk.
Topical Sulfur Applications Counseling: Topical Sulfur Counseling: Patient counseled that this medication may cause skin irritation or allergic reactions.  In the event of skin irritation, the patient was advised to reduce the amount of the drug applied or use it less frequently.   The patient verbalized understanding of the proper use and possible adverse effects of topical sulfur application.  All of the patient's questions and concerns were addressed.
Isotretinoin Pregnancy And Lactation Text: This medication is Pregnancy Category X and is considered extremely dangerous during pregnancy. It is unknown if it is excreted in breast milk.
Include Pregnancy/Lactation Warning?: No
Erythromycin Pregnancy And Lactation Text: This medication is Pregnancy Category B and is considered safe during pregnancy. It is also excreted in breast milk.
Bactrim Counseling:  I discussed with the patient the risks of sulfa antibiotics including but not limited to GI upset, allergic reaction, drug rash, diarrhea, dizziness, photosensitivity, and yeast infections.  Rarely, more serious reactions can occur including but not limited to aplastic anemia, agranulocytosis, methemoglobinemia, blood dyscrasias, liver or kidney failure, lung infiltrates or desquamative/blistering drug rashes.
High Dose Vitamin A Counseling: Side effects reviewed, pt to contact office should one occur.
Azithromycin Counseling:  I discussed with the patient the risks of azithromycin including but not limited to GI upset, allergic reaction, drug rash, diarrhea, and yeast infections.
Benzoyl Peroxide Counseling: Patient counseled that medicine may cause skin irritation and bleach clothing.  In the event of skin irritation, the patient was advised to reduce the amount of the drug applied or use it less frequently.   The patient verbalized understanding of the proper use and possible adverse effects of benzoyl peroxide.  All of the patient's questions and concerns were addressed.
Spironolactone Pregnancy And Lactation Text: This medication can cause feminization of the male fetus and should be avoided during pregnancy. The active metabolite is also found in breast milk.
Topical Retinoid Pregnancy And Lactation Text: This medication is Pregnancy Category C. It is unknown if this medication is excreted in breast milk.
Dapsone Counseling: I discussed with the patient the risks of dapsone including but not limited to hemolytic anemia, agranulocytosis, rashes, methemoglobinemia, kidney failure, peripheral neuropathy, headaches, GI upset, and liver toxicity.  Patients who start dapsone require monitoring including baseline LFTs and weekly CBCs for the first month, then every month thereafter.  The patient verbalized understanding of the proper use and possible adverse effects of dapsone.  All of the patient's questions and concerns were addressed.
Benzoyl Peroxide Pregnancy And Lactation Text: This medication is Pregnancy Category C. It is unknown if benzoyl peroxide is excreted in breast milk.
Doxycycline Counseling:  Patient counseled regarding possible photosensitivity and increased risk for sunburn.  Patient instructed to avoid sunlight, if possible.  When exposed to sunlight, patients should wear protective clothing, sunglasses, and sunscreen.  The patient was instructed to call the office immediately if the following severe adverse effects occur:  hearing changes, easy bruising/bleeding, severe headache, or vision changes.  The patient verbalized understanding of the proper use and possible adverse effects of doxycycline.  All of the patient's questions and concerns were addressed.
Dapsone Pregnancy And Lactation Text: This medication is Pregnancy Category C and is not considered safe during pregnancy or breast feeding.
Isotretinoin Counseling: Patient should get monthly blood tests, not donate blood, not drive at night if vision affected, not share medication, and not undergo elective surgery for 6 months after tx completed. Side effects reviewed, pt to contact office should one occur.
Topical Sulfur Applications Pregnancy And Lactation Text: This medication is Pregnancy Category C and has an unknown safety profile during pregnancy. It is unknown if this topical medication is excreted in breast milk.
Detail Level: Generalized
Detail Level: Detailed
Doxycycline Pregnancy And Lactation Text: This medication is Pregnancy Category D and not consider safe during pregnancy. It is also excreted in breast milk but is considered safe for shorter treatment courses.
Minocycline Counseling: Patient advised regarding possible photosensitivity and discoloration of the teeth, skin, lips, tongue and gums.  Patient instructed to avoid sunlight, if possible.  When exposed to sunlight, patients should wear protective clothing, sunglasses, and sunscreen.  The patient was instructed to call the office immediately if the following severe adverse effects occur:  hearing changes, easy bruising/bleeding, severe headache, or vision changes.  The patient verbalized understanding of the proper use and possible adverse effects of minocycline.  All of the patient's questions and concerns were addressed.
Erythromycin Counseling:  I discussed with the patient the risks of erythromycin including but not limited to GI upset, allergic reaction, drug rash, diarrhea, increase in liver enzymes, and yeast infections.
Bactrim Pregnancy And Lactation Text: This medication is Pregnancy Category D and is known to cause fetal risk.  It is also excreted in breast milk.
Birth Control Pills Pregnancy And Lactation Text: This medication should be avoided if pregnant and for the first 30 days post-partum.

## 2021-05-24 ENCOUNTER — APPOINTMENT (RX ONLY)
Dept: URBAN - METROPOLITAN AREA CLINIC 349 | Facility: CLINIC | Age: 26
Setting detail: DERMATOLOGY
End: 2021-05-24

## (undated) DEVICE — STERILE HOOK LOCK LATEX FREE ELASTIC BANDAGE 3INX5YD: Brand: HOOK LOCK™

## (undated) DEVICE — AMD ANTIMICROBIAL GAUZE SPONGES,12 PLY USP TYPE VII, 0.2% POLYHEXAMETHYLENE BIGUANIDE HCI (PHMB): Brand: CURITY

## (undated) DEVICE — APPLICATOR BNDG 1MM ADH PREMIERPRO EXOFIN

## (undated) DEVICE — SUTURE VCRL RAPIDE SZ 4-0 L18IN ABSRB UD L19MM PS-2 1/2 CIR VR496

## (undated) DEVICE — DRESSING,GAUZE,XEROFORM,CURAD,1"X8",ST: Brand: CURAD

## (undated) DEVICE — BNDG ELAS ESMARK 4INX12FT LF -- STRL

## (undated) DEVICE — SURGICAL PROCEDURE PACK BASIC ST FRANCIS

## (undated) DEVICE — BUTTON SWITCH PENCIL BLADE ELECTRODE, HOLSTER: Brand: EDGE

## (undated) DEVICE — REM POLYHESIVE ADULT PATIENT RETURN ELECTRODE: Brand: VALLEYLAB

## (undated) DEVICE — (D)PREP SKN CHLRAPRP APPL 26ML -- CONVERT TO ITEM 371833

## (undated) DEVICE — SUT ETHLN 4-0 18IN PS2 BLK --

## (undated) DEVICE — DISPOSABLE BIPOLAR CODE, 12' (3.66 M): Brand: CONMED

## (undated) DEVICE — SOLUTION IV 1000ML 0.9% SOD CHL

## (undated) DEVICE — SUTURE MCRYL SZ 4-0 L27IN ABSRB UD L19MM PS-2 1/2 CIR PRIM Y426H

## (undated) DEVICE — ZIMMER® STERILE DISPOSABLE TOURNIQUET CUFF WITH PLC, DUAL PORT, SINGLE BLADDER, 18 IN. (46 CM)